# Patient Record
Sex: FEMALE
[De-identification: names, ages, dates, MRNs, and addresses within clinical notes are randomized per-mention and may not be internally consistent; named-entity substitution may affect disease eponyms.]

---

## 2019-01-09 NOTE — XMS REPORT
Clinical Summary

                             Created on: 2019



Viky Arriaga

External Reference #: [id number]

: 1990

Sex: Female



Demographics







                          Address                   [address] [place]  [address]

 

                          Home Phone                [phone]

 

                          Preferred Language        English

 

                          Marital Status            Unknown

 

                          Gnosticist Affiliation     None

 

                          Race                      Unknown

 

                          Ethnic Group              /Latin





Author







                          Author                    [facility]

 

                          Organization              [facility]

 

                          Address                   Unknown

 

                          Phone                     Unavailable







Support







                Name            Relationship    Address         Phone

 

                    Daphney Arriaga       EC[employer]                [address]  [address]                      [phone]

 

                Rachel Cook    [employer]            Unknown         [phone]







Care Team Providers







                    Care Team Member Name    Role                Phone

 

                    Kar Black                  Unavailable

 

                    Sharpless           PCP                 [phone]







Allergies

No Known Allergies



Medications







                          End Date                  Status



              Medication     Sig          Dispensed     Refills      Start  



                                         Date  

 

                                                    Active



                 sulfamethoxazole-trimetho     Take 1 tablet      0                 



                     prim (BACTRIM,SEPTRA)     (80 mg of           5  



                           400-80 mg per tablet      trimethoprim     



                                         total) by     



                                         mouth daily.     

 

                                                    Active



                     SODIUM BICARBONATE, BULK,     650 mg by           0   



                           MISC                      Miscellaneous     



                                         route 5 pills     



                                         bid.     

 

                                                    Active



                 predniSONE (DELTASONE) 5     Take 5 mg by      0               /  



                     MG tablet           mouth daily.        5  

 

                                                    Active



                 tacrolimus (PROGRAF) 1 MG     Take by mouth      0                 



                     capsule             every 12            6  



                                         (twelve)     



                                         hours 4mg in     



                                         am and 5mg in     



                                         pm .     

 

                                                    Active



              labetalol (NORMODYNE) 200     Take 1 tablet     90 tablet     0            /201  



                     MG tablet           (200 mg             7  



                                         total) by     



                                         mouth 3     



                                         (three) times     



                                         daily.     







Active Problems







                                        Patient Care Coordination Note

 

                                        



>ESRD due to unknown etiology

>s/p renal transplant 2012, initial simulect induction, then had acute 
humoral rejection 2013, treated with plasma exchange, thymo, and rituximab.

>initial IS of neoral/myfortic/pred, now on prograf/myfortic/pred









 



                           Problem                   Noted Date

 

 



                           Ovarian cyst              04/15/2016

 

 



                           Anemia                    04/15/2016

 

 



                           Renal insufficiency       04/15/2016

 

 



                           S/P kidney transplant     04/15/2016

 

 



                           Constipation              10/20/2015

 

 



                                         Last Assessment & Plan:



                                         She has not had a BM in three days however states that she has not been



                                         taking her colace. Recommended to start taking Miralax. Will continue to



                                         monitor.

 

 



                           Numbness in both hands     10/06/2015

 

 



                                         Last Assessment & Plan:



                                         Likely due to prograf. Should improve. Continue to monitor

 

 



                           Immunosuppression         10/06/2015

 

 



                                         Last Assessment & Plan:



                                         Prograf level is pending, we will adjust her dose accordingly. She does



                                         have a slight hand tremor. She states there is less numbness and tingling



                                         than before. Continue to monitor and adjust dose as necessary.

 

 



                           Dysuria                   2015

 

 



                                         Last Assessment & Plan:



                                         Probably secondary to small bladder and stent.  Will check UA C and S.

 

 



                           Noncompliance             2014

 

 



                                         Last Assessment & Plan:



                                         Improved.  Will continue to follow closely

 

 



                           Abdominal pain            2013

 

 



                           Renal failure             2013

 

 



                           KELLEY (acute kidney injury)     2013

 

 



                           Pneumonia                 2013

 

 



                           Fever                     2013

 

 



                           ESRD (end stage renal disease)     2012

 

 



                                         Last Assessment & Plan:



                                         24 y/o female with ESRD secondary to HTN who is s/p bilateral nephrectomy,



                                         DDKT transplant, and transplant nephrectomy who presents today to discuss



                                         re-evaluation for listing for kidney transplant.  Her first kidney failed



                                         largtely due to issues of non-compliance but the patient states that she



                                         has changed her life.  She will need to have clearance by social work and



                                         all involved in her care prior to re-listing.

 

 



                           Congenital multiple renal cysts     2012

 

 



                                         Overview:



                                         Simple Renal Cysts

 

 



                           Status post kidney transplant     2012

 

 



                                         Last Assessment & Plan:



                                         Her creatinine is trending down. Today it is 0.92. She is producing



                                         adequate urine.

 

 



                                         Hypertension 

 

 



                                         Last Assessment & Plan:



                                         Blood pressure is better controlled. She will continue to follow with her



                                         nephrologist.

 

 



                                         Nonischemic cardiomyopathy 

 

 



                                         Overview:



                                         secondary to severe hypertension





                                         L



                                         ast Assessment & Plan:



                                         Patient was scheduled to have a stress test performed and the patient says



                                         it was completed but we cannot locate the records in [facility].  Will



                                         review results in next MRB to discuss this patient.

 

 



                                         Renal disease 

 

 



                                         Overview:



                                         etiology of renal disease with biopsy done at presentation showing



                                         arteriosclerosis and chronic tubular interstitial changes versus global



                                         glomerulosclerosis

 

 



                                         HTN (hypertension) 

 

 



                                         Overview:



                                         etiology of renal disease with biopsy done at presentation showing



                                         arteriosclerosis and chronic tubular interstitial changes versus global



                                         glomerulosclerosis





                                         L



                                         ast Assessment & Plan:



                                         Blood pressure is controlled with a few elevated readings according to her



                                         logs. She will continue to follow with her nephrologist.







Family History







   



                 Medical History     Relation        Name            Comments

 

   



                           Hypertension              Father  









   



                 Relation        Name            Status          Comments

 

   



                                         Father   







Social History







                                        Date



                 Tobacco Use     Types           Packs/Day       Years Used 

 

                                         



                                         Never Smoker    

 

    



                                         Smokeless Tobacco: Never   



                                         Used   









   



                 Alcohol Use     Drinks/Week     oz/Week         Comments

 

   



                           Yes                       occasional









 



                           Sex Assigned at Birth     Date Recorded

 

 



                                         Not on file 









                                        Industry



                           Job Start Date            Occupation 

 

                                        Not on file



                           Not on file               Not on file 









                                        Travel End



                           Travel History            Travel Start 

 





                                         No recent travel history available.







Last Filed Vital Signs

Not on file



Plan of Treatment







   



                 Health Maintenance     Due Date        Last Done       Comments

 

   



                           INFLUENZA VACCINE         10/01/2018  







Implants







                    Device Identifier    Shelf Expiration Date    Model / Serial / Lot



                 Implanted       Type            Area            Manufactur   



                                         er   

 

                                        2018          [id number] /

 /

                                        [id number]



                 Stent,Uret Polaris 5fr X 10cm -     Uro Stent       Left: Ureter     [place]   



                           [id number]                 SCIENTIFIC   



                                         Implanted: Qty: 1 on 2015 by      



                                         Deborah Alvarado MD      







Results

Not on fileafter 2018



Insurance







     



            Payer      Benefit     Subscriber ID     Type       Phone      Address



                                         Plan /    



                                         Group    

 

     



                 MEDICARE        MEDICARE A      xxxxxxxxxx      Medicare  



                                         B    

 

     



                     [facility]              xxxxxxxxx   



                           MEDICARE [facility] CARE         MEDICARE    



                                         [facility]    

 

     



                 MEDICAID        MEDICAID        xxxxxxxxx       [place]    









     



            Guarantor Name     Account     Relation to     Date of     Phone      Billing Address



                     Type                Patient             Birth  

 

     



            Viky Arriaga     Personal/F     Self       1990     [phone]     [address]               (Home)              [place] [address] [phone] 



                                         (Work) 







Advance Directives





For more information, please contact:



[address] [address] [phone]









                          Date Inactivated          Comments



                           Code Status               Date Activated  

 

                          2016  7:41 PM         



                           Full Code                 2016 11:17 AM  









  



                           This code status was determined by:     Patient 









                                                     

 

                          2016 11:49 PM         



                           Full Code                 2016  5:26 PM  









  



                           This code status was determined by:     Patient 









                                                     

 

                          2016 12:52 AM         



                           Full Code                 4/15/2016  8:15 AM  









  



                           This code status was determined by:     Patient 









                                                     

 

                          2015  1:08 PM         



                           Full Code                 2015  4:52 AM  









  



                           This code status was determined by:     Patient 









                                                     

 

                          2013  4:56 PM         



                           Code ONE                  2013  7:56 PM

## 2019-01-09 NOTE — DIAGNOSTIC IMAGING REPORT
EXAM: CT Abdomen and Pelvis WITHOUT contrast  

INDICATION: Severe abdominal pain and diarrhea.

COMPARISON: None.

TECHNIQUE: Abdomen and pelvis were scanned utilizing a multidetector helical

scanner from the lung base to the pubic symphysis without administration of IV

contrast. Absence of intravenous contrast decreases sensitivity for detection

of focal lesions and vascular pathology. Coronal and sagittal reformations were

obtained. Routine protocol was performed. 

            IV CONTRAST: None.

            ORAL CONTRAST: Water

            RADIATION DOSE: Total DLP: 310.71 mGy*cm

             Estimated effective dose: (DLP x 0.015 x size factor) mSv

            COMPLICATIONS: None



FINDINGS:



LINES and TUBES: None.



LOWER THORAX:  Unremarkable



HEPATOBILIARY: 9 mm low-attenuation the lateral segment of the left hepatic

lobe on image 37 not well evaluated due to the lack of contrast. No biliary

ductal dilation. 



GALLBLADDER: Absent.



SPLEEN: No splenomegaly. 



PANCREAS: No focal masses or ductal dilatation.  



ADRENALS: No adrenal nodules    



KIDNEYS/URETERS: There are no areas in the renal fossa bilaterally. There is a

kidney in the left iliac fossa, either transplant kidney versus less likely an

ectopic pelvic kidney.



GI TRACT: No abnormal distention, wall thickening, or evidence of bowel

obstruction.  Fluid attenuation within the colon is nonspecific, however, may

be related to patient's reported diarrhea.  Appendix is normal. Linear metallic

densities in the right lower quadrant associated with mild stranding,

nonspecific.



PELVIC ORGANS/BLADDER: Unremarkable.



LYMPH NODES: No lymphadenopathy.



VESSELS: Unremarkable.



PERITONEUM / RETROPERITONEUM: No free air or fluid.



BONES: Remote healed left-sided rib fractures.



SOFT TISSUES: Unremarkable.            



IMPRESSION: 

1.  No acute abdominal pelvic abnormality.

2.  Absent or severely atrophic native kidneys, with a transplant kidney in the

left iliac fossa not well evaluated with this noncontrast examination.



Signed by: Dr. ANNIE Bauman M.D. on 1/9/2019 8:02 PM

## 2019-01-09 NOTE — XMS REPORT
Patient Summary Document

                             Created on: 2019



LUZ MURRAY

External Reference #: [id number]

: 1990

Sex: Female



Demographics







                          Address                   [address] [place]  [address]

 

                          Home Phone                [phone]

 

                          Preferred Language        Unknown

 

                          Marital Status            Unknown

 

                          Cheondoism Affiliation     Unknown

 

                          Race                      Unknown

 

                          Ethnic Group              Unknown





Author







                          Author                    [facility]

 

                          Address                   Unknown

 

                          Phone                     Unavailable







Care Team Providers







                    Care Team Member Name    Role                Phone

 

                    DEISY MINAYAKLEVER MERISSA    Unavailable         Unavailable







Problems

This patient has no known problems.



Allergies, Adverse Reactions, Alerts

This patient has no known allergies or adverse reactions.



Medications

This patient has no known medications.



Results







           Test Description    Test Time    Test Comments    Text Results    Atomic Results    Result

 Comments

 

                URINE CULTURE    2017 10:48:00                      

 

   

 

                CULTURE (BEAKER) (test code=1095)    ESCHERICHIA COLI                    >100,000 col/mL Escherichia

 coli

 

                Amikacin (test code=1)                                     

 

                Ampicillin + Sulbactam (test code=6)                                     

 

                Aztreonam (test code=32)                                     

 

                Cefazolin (test code=9)                                     

 

                Cefepime (test code=51)                                     

 

                Cefoxitin (test code=68)                                     

 

                Ceftazidime (test code=27)                                     

 

                Ceftriaxone (test code=52)                                     

 

                Ertapenem (test code=38)                                     

 

                Gentamicin (test code=18)                                     

 

                Levofloxacin (test code=22)                                     

 

                Meropenem (test code=34)                                     

 

                Nitrofurantoin (test code=23)                                     

 

                Piperacillin + Tazobactam (test code=29)                                     

 

                Tetracycline (test code=2)                                     

 

                Tigecycline (test code=133)                                     

 

                Tobramycin (test code=25)                                     

 

                Trimethoprim + Sulfamethoxazole (test code=47)                                     





RAPID STREP A [id number] 14:46:00* 





                Test Item       Value           Reference Range    Comments

 

                STREP A ANTIGEN (BEAKER) (test code=556)    Positive        Negative         





URINALYSIS W/ [id number] 14:45:00* 





                Test Item       Value           Reference Range    Comments

 

                COLOR (BEAKER) (test code=470)    Yellow                           

 

                CLARITY (BEAKER) (test code=469)    Hazy                             

 

                SPECIFIC GRAVITY UA (BEAKER) (test code=468)    1.011           1.001-1.035      

 

                PH UA (BEAKER) (test code=467)    5.5             5.0-8.0          

 

                PROTEIN UA (BEAKER) (test code=464)    30 mg/dL        Negative         

 

                GLUCOSE UA (BEAKER) (test code=365)    Negative        Negative         

 

                KETONES UA (BEAKER) (test code=371)    Negative        Negative         

 

                BILIRUBIN UA (BEAKER) (test code=462)    Negative        Negative         

 

                BLOOD UA (BEAKER) (test code=461)    Negative        Negative         

 

                NITRITE UA (BEAKER) (test code=465)    Positive        Negative         

 

                LEUKOCYTE ESTERASE UA (BEAKER) (test code=466)    Large           Negative         

 

                UROBILINOGEN UA (BEAKER) (test code=463)    0.2 mg/dL       0.2-1.0          

 

                RBC UA (BEAKER) (test code=519)    0 /HPF                           

 

                WBC UA (BEAKER) (test code=520)    107 /HPF                         

 

                BACTERIA (BEAKER) (test lfsf=582)    Many                             

 

                MUCUS (BEAKER) (test code=1574)    Rare                             

 

                SQUAMOUS EPITHELIAL (BEAKER) (test code=516)    4 /HPF                           

 

                SOURCE(BEAKER) (test code=2795)    Urine, Clean Catch                     





BASIC METABOLIC [id number] 14:33:00* 





                Test Item       Value           Reference Range    Comments

 

                SODIUM (BEAKER) (test code=381)    136 meq/L       136-145          

 

                POTASSIUM (BEAKER) (test code=379)    3.8 meq/L       3.5-5.1          

 

                CHLORIDE (BEAKER) (test code=382)    109 meq/L                  

 

                CO2 (BEAKER) (test code=355)    17 meq/L        22-29            

 

                BLOOD UREA NITROGEN (BEAKER) (test code=354)    26 mg/dL        7-21             

 

                CREATININE (BEAKER) (test code=358)    1.47 mg/dL      0.57-1.25        

 

                GLUCOSE RANDOM (BEAKER) (test code=652)    117 mg/dL                  

 

                CALCIUM (BEAKER) (test code=697)    9.6 mg/dL       8.4-10.2         

 

                EGFR (BEAKER) (test code=1092)    42 mL/min/1.73 sq m                    ESTIMATED GFR IS NOT AS 

ACCURATE AS CREATININE CLEARANCE IN PREDICTING GLOMERULAR FILTRATION RATE. 
ESTIMATED GFR IS NOT APPLICABLE FOR DIALYSIS PATIENTS.





CBC W/PLT COUNT & AUTO [id number] 14:24:00* 





                Test Item       Value           Reference Range    Comments

 

                WHITE BLOOD CELL COUNT (BEAKER) (test code=775)    8.2 K/ L        4.0-10.0         

 

                RED BLOOD CELL COUNT (BEAKER) (test code=761)    3.25 M/ L       4.00-5.00        

 

                HEMOGLOBIN (BEAKER) (test code=410)    10.6 GM/DL      12.0-15.0        

 

                HEMATOCRIT (BEAKER) (test code=411)    31.2 %          36.0-45.0        

 

                MEAN CORPUSCULAR VOLUME (BEAKER) (test code=753)    96.0 fL         82.0-99.0        

 

                MEAN CORPUSCULAR HEMOGLOBIN (BEAKER) (test code=751)    32.7 pg         27.0-33.0        

 

                    MEAN CORPUSCULAR HEMOGLOBIN CONC (BEAKER) (test code=752)    34.0 GM/DL          32.0-36.0

                                         

 

                RED CELL DISTRIBUTION WIDTH (BEAKER) (test code=412)    14.5 %          10.3-14.2        

 

                PLATELET COUNT (BEAKER) (test code=756)    137 K/CU MM     150-430          

 

                MEAN PLATELET VOLUME (BEAKER) (test code=754)    7.2 fL          6.5-10.5         

 

                NUCLEATED RED BLOOD CELLS (BEAKER) (test code=413)    0 /100 WBC      0-0              

 

                NEUTROPHILS RELATIVE PERCENT (BEAKER) (test code=429)    90 %                             

 

                LYMPHOCYTES RELATIVE PERCENT (BEAKER) (test code=430)    3 %                              

 

                MONOCYTES RELATIVE PERCENT (BEAKER) (test code=431)    7 %                              

 

                EOSINOPHILS RELATIVE PERCENT (BEAKER) (test code=432)    1 %                              

 

                BASOPHILS RELATIVE PERCENT (BEAKER) (test code=437)    0 %                              

 

                NEUTROPHILS ABSOLUTE COUNT (BEAKER) (test code=670)    7.38 K/ L       1.80-8.00        

 

                LYMPHOCYTES ABSOLUTE COUNT (BEAKER) (test code=414)    0.23 K/ L       1.48-4.50        

 

                MONOCYTES ABSOLUTE COUNT (BEAKER) (test code=415)    0.54 K/ L       0.00-1.30        

 

                EOSINOPHILS ABSOLUTE COUNT (BEAKER) (test code=416)    0.05 K/ L       0.00-0.50        

 

                BASOPHILS ABSOLUTE COUNT (BEAKER) (test code=417)    0.00 K/ L       0.00-0.20        





0.00

## 2019-01-09 NOTE — NUR
PT MOVED FROM ER 4 TO ER 11 AND I ASSUMED PTS CARE. PT ALREADY IN A GOWN. 
PLACED HER ON THE MONITORS. PT STILL NEEDS AN IV

## 2019-11-04 NOTE — NUR
[facility] CALLED TO OBTAIN ACLS GROUND TRANSPORT TO [facility], ETA 30-40 MIN.

## 2019-11-04 NOTE — NUR
Severe Sepsis Time: 1828



Source: suspected intra abdominal (1645)

SIRS:  RR 24 (1645)

Organ Dysfunction: Cr 2.44 (1828)



Blood cultures collected

#1 Lactic Acid collected: 0.9, no indications for repeat

Broad Spectrum Antibiotic given: Ceftriaxone 1715



Pt required transfer to [facility] 2/2 renal transplant with concerns 
of rejection

Diagnosis: Severe Sepsis, Acute Renal Failure



House supervisor informed of indications for tranfer: higher level of care, 
transplant service 



Pt signed out to Dr. Montes to complete transfer process

## 2019-11-04 NOTE — DIAGNOSTIC IMAGING REPORT
EXAMINATION:  CHEST SINGLE (PORTABLE)   



COMPARISON:  No relevant priors



INDICATION: Fever, chills, vomiting 

^chest pain

^[id number]

^1828  



DISCUSSION:

Frontal view of the chest obtained at 1838 hours.



HEART AND MEDIASTINUM:  The cardiomediastinal silhouette is unremarkable.

  

LINES:  None.



LUNGS:  The lungs are well inflated and clear. No pneumonia or pulmonary edema.



PLEURA:  No pleural effusion or pneumothorax.



BONES AND SOFT TISSUES:  No focal osseous lesion. There are surgical clips in

the right axilla.





IMPRESSION: 

No acute cardiopulmonary disease.



Signed by: Dr. Rhett Vasquez MD on 11/4/2019 7:03 PM

## 2020-11-21 NOTE — NUR
INDICATION: Status post fall

 

COMPARISON: None.

 

IMPRESSION: 

 

Pelvis: 2 views obtained. Degenerative changes the hips. No evidence of 

dislocation. A definite acute fracture line is not seen.

 

Bilateral ankle: 3 views of each ankle obtained.

Soft tissue swelling at the left ankle without a definite acute fracture 

or dislocation. There is some degenerative changes seen.

There is soft tissue swelling at the right ankle is some degenerative 

changes. No evidence of acute fracture or dislocation.

 

Bilateral feet: 3 views of each foot obtained.

Edema is seen within the soft tissues of the right foot. Erosions are seen

at the first metatarsal head with hypertrophic changes. Portion this could

be from cyst formation as well. A definite acute fracture line is not 

seen. There is some overlap limiting midfoot.

Hypertrophic changes at the left first metatarsophalangeal joint. There is

some overlap limiting midfoot but a definite acute fracture line is not 

seen.

 

Electronically signed by: Thai Monae MD (11/21/2020 5:16 PM) 

[id number] transfer initiated-spoke to jeffy at [facility]

## 2022-03-31 ENCOUNTER — HOSPITAL ENCOUNTER (EMERGENCY)
Dept: HOSPITAL 88 - ER | Age: 32
Discharge: HOME | End: 2022-03-31
Payer: SELF-PAY

## 2022-03-31 VITALS — SYSTOLIC BLOOD PRESSURE: 106 MMHG

## 2022-03-31 VITALS — BODY MASS INDEX: 32.25 KG/M2 | WEIGHT: 160 LBS | HEIGHT: 59 IN

## 2022-03-31 DIAGNOSIS — Z94.0: ICD-10-CM

## 2022-03-31 DIAGNOSIS — R10.30: Primary | ICD-10-CM

## 2022-03-31 DIAGNOSIS — Z86.73: ICD-10-CM

## 2022-03-31 DIAGNOSIS — I10: ICD-10-CM

## 2022-03-31 DIAGNOSIS — R11.2: ICD-10-CM

## 2022-03-31 LAB
ALBUMIN SERPL-MCNC: 4 G/DL (ref 3.5–5)
ALBUMIN/GLOB SERPL: 0.9 {RATIO} (ref 0.8–2)
ALP SERPL-CCNC: 79 IU/L (ref 40–150)
ALT SERPL-CCNC: 17 IU/L (ref 0–55)
AMPHETAMINES UR QL SCN>1000 NG/ML: NEGATIVE
ANION GAP SERPL CALC-SCNC: 16.1 MMOL/L (ref 8–16)
BACTERIA URNS QL MICRO: (no result) /HPF
BASOPHILS # BLD AUTO: 0 10*3/UL (ref 0–0.1)
BASOPHILS NFR BLD AUTO: 0.4 % (ref 0–1)
BENZODIAZ UR QL SCN: NEGATIVE
BUN SERPL-MCNC: 26 MG/DL (ref 7–26)
BUN/CREAT SERPL: 17 (ref 6–25)
CALCIUM SERPL-MCNC: 10.6 MG/DL (ref 8.4–10.2)
CHLORIDE SERPL-SCNC: 106 MMOL/L (ref 98–107)
CLARITY UR: (no result)
CO2 SERPL-SCNC: 21 MMOL/L (ref 22–29)
COLOR UR: YELLOW
DEPRECATED NEUTROPHILS # BLD AUTO: 5 10*3/UL (ref 2.1–6.9)
DEPRECATED RBC URNS MANUAL-ACNC: (no result) /HPF (ref 0–5)
EGFRCR SERPLBLD CKD-EPI 2021: 39 ML/MIN (ref 60–?)
EOSINOPHIL # BLD AUTO: 0.1 10*3/UL (ref 0–0.4)
EOSINOPHIL NFR BLD AUTO: 0.9 % (ref 0–6)
EPI CELLS URNS QL MICRO: (no result) /LPF
ERYTHROCYTE [DISTWIDTH] IN CORD BLOOD: 13.8 % (ref 11.7–14.4)
GLOBULIN PLAS-MCNC: 4.5 G/DL (ref 2.3–3.5)
GLUCOSE SERPLBLD-MCNC: 93 MG/DL (ref 74–118)
HCT VFR BLD AUTO: 34.5 % (ref 34.2–44.1)
HGB BLD-MCNC: 11.6 G/DL (ref 12–16)
KETONES UR QL STRIP.AUTO: NEGATIVE
LEUKOCYTE ESTERASE UR QL STRIP.AUTO: NEGATIVE
LIPASE SERPL-CCNC: 20 U/L (ref 8–78)
LYMPHOCYTES # BLD: 1.4 10*3/UL (ref 1–3.2)
LYMPHOCYTES NFR BLD AUTO: 20.3 % (ref 18–39.1)
MCH RBC QN AUTO: 33 PG (ref 28–32)
MCHC RBC AUTO-ENTMCNC: 33.6 G/DL (ref 31–35)
MCV RBC AUTO: 98 FL (ref 81–99)
MONOCYTES # BLD AUTO: 0.5 10*3/UL (ref 0.2–0.8)
MONOCYTES NFR BLD AUTO: 6.6 % (ref 4.4–11.3)
NEUTS SEG NFR BLD AUTO: 71.5 % (ref 38.7–80)
NITRITE UR QL STRIP.AUTO: NEGATIVE
PCP UR QL SCN: NEGATIVE
PLATELET # BLD AUTO: 279 X10E3/UL (ref 140–360)
POTASSIUM SERPL-SCNC: 4.1 MMOL/L (ref 3.5–5.1)
PROT UR QL STRIP.AUTO: (no result)
RBC # BLD AUTO: 3.52 X10E6/UL (ref 3.6–5.1)
SODIUM SERPL-SCNC: 139 MMOL/L (ref 136–145)
SP GR UR STRIP: 1.02 (ref 1.01–1.02)
UROBILINOGEN UR STRIP-MCNC: 0.2 MG/DL (ref 0.2–1)
WBC #/AREA URNS HPF: (no result) /HPF (ref 0–5)

## 2022-03-31 PROCEDURE — 80053 COMPREHEN METABOLIC PANEL: CPT

## 2022-03-31 PROCEDURE — 85025 COMPLETE CBC W/AUTO DIFF WBC: CPT

## 2022-03-31 PROCEDURE — 84702 CHORIONIC GONADOTROPIN TEST: CPT

## 2022-03-31 PROCEDURE — 81001 URINALYSIS AUTO W/SCOPE: CPT

## 2022-03-31 PROCEDURE — 74176 CT ABD & PELVIS W/O CONTRAST: CPT

## 2022-03-31 PROCEDURE — 83690 ASSAY OF LIPASE: CPT

## 2022-03-31 PROCEDURE — 99284 EMERGENCY DEPT VISIT MOD MDM: CPT

## 2022-03-31 PROCEDURE — 80307 DRUG TEST PRSMV CHEM ANLYZR: CPT

## 2022-03-31 PROCEDURE — 36415 COLL VENOUS BLD VENIPUNCTURE: CPT

## 2022-11-12 ENCOUNTER — HOSPITAL ENCOUNTER (EMERGENCY)
Dept: HOSPITAL 88 - ER | Age: 32
Discharge: HOME | End: 2022-11-12
Payer: COMMERCIAL

## 2022-11-12 VITALS — HEIGHT: 59 IN | BODY MASS INDEX: 32.25 KG/M2 | WEIGHT: 160 LBS
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
## 2022-11-12 DIAGNOSIS — M25.571: Primary | ICD-10-CM

## 2022-11-12 DIAGNOSIS — Z94.0: ICD-10-CM
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
## 2022-11-12 DIAGNOSIS — I10: ICD-10-CM

## 2022-11-12 DIAGNOSIS — F41.9: ICD-10-CM

## 2022-11-12 DIAGNOSIS — Z86.73: ICD-10-CM
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
## 2022-11-12 PROCEDURE — 99283 EMERGENCY DEPT VISIT LOW MDM: CPT

## 2022-11-17 ENCOUNTER — HOSPITAL ENCOUNTER (EMERGENCY)
Dept: HOSPITAL 88 - ER | Age: 32
Discharge: OTHER INSTITUTION | End: 2022-11-17
Payer: SELF-PAY

## 2022-11-17 VITALS — SYSTOLIC BLOOD PRESSURE: 131 MMHG | DIASTOLIC BLOOD PRESSURE: 94 MMHG

## 2022-11-17 VITALS — BODY MASS INDEX: 33.26 KG/M2 | HEIGHT: 59 IN | WEIGHT: 165 LBS

## 2022-11-17 DIAGNOSIS — Z20.822: ICD-10-CM

## 2022-11-17 DIAGNOSIS — N28.9: ICD-10-CM

## 2022-11-17 DIAGNOSIS — A41.9: ICD-10-CM

## 2022-11-17 DIAGNOSIS — N12: ICD-10-CM

## 2022-11-17 DIAGNOSIS — R50.9: Primary | ICD-10-CM

## 2022-11-17 DIAGNOSIS — Z94.0: ICD-10-CM

## 2022-11-17 LAB
ALBUMIN SERPL-MCNC: 3.7 G/DL (ref 3.5–5)
ALBUMIN/GLOB SERPL: 0.8 {RATIO} (ref 0.8–2)
ALP SERPL-CCNC: 104 IU/L (ref 40–150)
ALT SERPL-CCNC: 19 IU/L (ref 0–55)
ANION GAP SERPL CALC-SCNC: 17.2 MMOL/L (ref 8–16)
BACTERIA URNS QL MICRO: (no result) /HPF
BASOPHILS # BLD AUTO: 0 10*3/UL (ref 0–0.1)
BASOPHILS NFR BLD AUTO: 0.2 % (ref 0–1)
BUN SERPL-MCNC: 32 MG/DL (ref 7–26)
BUN/CREAT SERPL: 16 (ref 6–25)
CALCIUM SERPL-MCNC: 9.4 MG/DL (ref 8.4–10.2)
CHLORIDE SERPL-SCNC: 108 MMOL/L (ref 98–107)
CK MB SERPL-MCNC: 0.4 NG/ML (ref 0–5)
CK SERPL-CCNC: 39 IU/L (ref 29–168)
CLARITY UR: (no result)
CO2 SERPL-SCNC: 15 MMOL/L (ref 22–29)
COLOR UR: YELLOW
DEPRECATED APTT PLAS QN: 39.6 SECONDS (ref 23.8–35.5)
DEPRECATED INR PLAS: 0.96
DEPRECATED NEUTROPHILS # BLD AUTO: 9.8 10*3/UL (ref 2.1–6.9)
DEPRECATED RBC URNS MANUAL-ACNC: >50 /HPF (ref 0–5)
EOSINOPHIL # BLD AUTO: 0 10*3/UL (ref 0–0.4)
EOSINOPHIL NFR BLD AUTO: 0.2 % (ref 0–6)
EPI CELLS URNS QL MICRO: (no result) /LPF
ERYTHROCYTE [DISTWIDTH] IN CORD BLOOD: 13.2 % (ref 11.7–14.4)
GLOBULIN PLAS-MCNC: 4.7 G/DL (ref 2.3–3.5)
GLUCOSE SERPLBLD-MCNC: 117 MG/DL (ref 74–118)
HCT VFR BLD AUTO: 36.9 % (ref 34.2–44.1)
HGB BLD-MCNC: 11.9 G/DL (ref 12–16)
KETONES UR QL STRIP.AUTO: NEGATIVE
LEUKOCYTE ESTERASE UR QL STRIP.AUTO: (no result)
LYMPHOCYTES # BLD: 0.4 10*3/UL (ref 1–3.2)
LYMPHOCYTES NFR BLD AUTO: 3.2 % (ref 18–39.1)
MCH RBC QN AUTO: 33.8 PG (ref 28–32)
MCHC RBC AUTO-ENTMCNC: 32.2 G/DL (ref 31–35)
MCV RBC AUTO: 104.8 FL (ref 81–99)
MONOCYTES # BLD AUTO: 0.7 10*3/UL (ref 0.2–0.8)
MONOCYTES NFR BLD AUTO: 6 % (ref 4.4–11.3)
NEUTS SEG NFR BLD AUTO: 89.8 % (ref 38.7–80)
NITRITE UR QL STRIP.AUTO: POSITIVE
PLATELET # BLD AUTO: 210 X10E3/UL (ref 140–360)
POTASSIUM SERPL-SCNC: 4.2 MMOL/L (ref 3.5–5.1)
PROT UR QL STRIP.AUTO: (no result)
PROTHROMBIN TIME: 13.7 SECONDS (ref 11.9–14.5)
RBC # BLD AUTO: 3.52 X10E6/UL (ref 3.6–5.1)
SODIUM SERPL-SCNC: 136 MMOL/L (ref 136–145)
SP GR UR STRIP: 1.02 (ref 1.01–1.02)
UROBILINOGEN UR STRIP-MCNC: 0.2 MG/DL (ref 0.2–1)
WBC #/AREA URNS HPF: >50 /HPF (ref 0–5)

## 2022-11-17 PROCEDURE — 93005 ELECTROCARDIOGRAM TRACING: CPT

## 2022-11-17 PROCEDURE — 82553 CREATINE MB FRACTION: CPT

## 2022-11-17 PROCEDURE — 36415 COLL VENOUS BLD VENIPUNCTURE: CPT

## 2022-11-17 PROCEDURE — 81001 URINALYSIS AUTO W/SCOPE: CPT

## 2022-11-17 PROCEDURE — 85025 COMPLETE CBC W/AUTO DIFF WBC: CPT

## 2022-11-17 PROCEDURE — 87040 BLOOD CULTURE FOR BACTERIA: CPT

## 2022-11-17 PROCEDURE — 84702 CHORIONIC GONADOTROPIN TEST: CPT

## 2022-11-17 PROCEDURE — 99284 EMERGENCY DEPT VISIT MOD MDM: CPT

## 2022-11-17 PROCEDURE — 85730 THROMBOPLASTIN TIME PARTIAL: CPT

## 2022-11-17 PROCEDURE — 85610 PROTHROMBIN TIME: CPT

## 2022-11-17 PROCEDURE — 74176 CT ABD & PELVIS W/O CONTRAST: CPT

## 2022-11-17 PROCEDURE — 80053 COMPREHEN METABOLIC PANEL: CPT

## 2022-11-17 PROCEDURE — 87086 URINE CULTURE/COLONY COUNT: CPT

## 2022-11-17 PROCEDURE — 83605 ASSAY OF LACTIC ACID: CPT

## 2022-11-17 PROCEDURE — 82550 ASSAY OF CK (CPK): CPT

## 2022-11-17 PROCEDURE — 84484 ASSAY OF TROPONIN QUANT: CPT

## 2022-11-17 PROCEDURE — 87186 SC STD MICRODIL/AGAR DIL: CPT

## 2022-11-17 PROCEDURE — 71045 X-RAY EXAM CHEST 1 VIEW: CPT
